# Patient Record
Sex: FEMALE | URBAN - METROPOLITAN AREA
[De-identification: names, ages, dates, MRNs, and addresses within clinical notes are randomized per-mention and may not be internally consistent; named-entity substitution may affect disease eponyms.]

---

## 2022-05-18 ENCOUNTER — OFFICE VISIT (OUTPATIENT)
Dept: URBAN - METROPOLITAN AREA CLINIC 51 | Facility: CLINIC | Age: 42
End: 2022-05-18
Payer: COMMERCIAL

## 2022-05-18 DIAGNOSIS — S05.8X2D OTHER INJURIES OF LEFT EYE AND ORBIT, SUBSEQUENT ENCOUNTER: Primary | ICD-10-CM

## 2022-05-18 PROCEDURE — 92004 COMPRE OPH EXAM NEW PT 1/>: CPT | Performed by: OPTOMETRIST

## 2022-05-18 PROCEDURE — 92134 CPTRZ OPH DX IMG PST SGM RTA: CPT | Performed by: OPTOMETRIST

## 2022-05-18 ASSESSMENT — INTRAOCULAR PRESSURE
OS: 10
OD: 11

## 2022-05-18 NOTE — IMPRESSION/PLAN
Impression: Other injuries of left eye and orbit, subsequent encounter: S05.8x2D. Plan: Pt was seen in ER after had CT scan, no broken bones. retina is intact. some lid and facial edema.  use Cool compresses and ATS 
if any pain occurs when looking around go back to ER for another CT scan to make sure there are no broken bones